# Patient Record
(demographics unavailable — no encounter records)

---

## 2024-10-22 NOTE — HEALTH RISK ASSESSMENT
[1/2 of Days or More (2)] : 4.) Feeling tired or having little energy? Half the days or more [2] : 2) Feeling down, depressed, or hopeless for more than half of the days (2) [PHQ-2 Positive] : PHQ-2 Positive [PHQ-9 Positive] : PHQ-9 Positive [HIV test declined] : HIV test declined [Hepatitis C test declined] : Hepatitis C test declined [Employed] : employed [Sexually Active] : sexually active [Fully functional (bathing, dressing, toileting, transferring, walking, feeding)] : Fully functional (bathing, dressing, toileting, transferring, walking, feeding) [Fully functional (using the telephone, shopping, preparing meals, housekeeping, doing laundry, using] : Fully functional and needs no help or supervision to perform IADLs (using the telephone, shopping, preparing meals, housekeeping, doing laundry, using transportation, managing medications and managing finances) [Current] : Current [0-4] : 0-4 [Nearly Every Day (3)] : 2.) Feeling down, depressed or hopeless? Nearly every day [Several Days (1)] : 6.) Feeling bad about yourself, or that you are a failure, or have let yourself or your family down? Several days [Not at All (0)] : 9.) Thoughts that you would be off dead or of hurting yourself in some way? Not at all [Moderate] : Severity of Depression is Moderate [Very Difficult] : How difficult have these problems made it for you to do your work, take care of things at home, or get along with people? Very difficult [I have developed a follow-up plan documented below in the note.] : I have developed a follow-up plan documented below in the note. [[id number]] : 4 [[id number]] : 10 [Reports changes in hearing] : Reports no changes in hearing [Reports changes in vision] : Reports no changes in vision [PapSmearComments] : due

## 2024-10-22 NOTE — PHYSICAL EXAM
[No Acute Distress] : no acute distress [Normal Sclera/Conjunctiva] : normal sclera/conjunctiva [Normal Outer Ear/Nose] : the outer ears and nose were normal in appearance [Normal] : affect was normal and insight and judgment were intact [de-identified] : underweight [de-identified] : depressed mood, no suicidal thoughts or thoughts of self harm

## 2024-10-22 NOTE — HISTORY OF PRESENT ILLNESS
[FreeTextEntry1] : annual physical [de-identified] : Pt is a 21 y/o F with PMHx of anxiety who presents to the office today for an annual physical  Mental health: - depressed mood - occurring for past few months - pt is requesting help from mental health counselor - See below for PHQ9  Pt is inquiring about a possible allergy to fish. 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 She also can get upset stomach and diarrhea.  HCM - Influenza vaccine: defers - Tdap vaccine: needs. defers - Gardasil: UTD  - Pap: never had, due - Ophthalmology: UTD  - Dentist: needs  - Diet: pescatarian (previously vegetarian). Tries to eat a well balanced diet - Declines STD testing

## 2024-10-22 NOTE — END OF VISIT
[FreeTextEntry3] : I, Dr. Elliott, personally performed the evaluation and management (E/M) services for this established patient who presents today with (a) new problem(s)/exacerbation of (an) existing condition(s).  That E/M includes conducting the examination, assessing all new/exacerbated conditions, and establishing a new plan of care.  Today, my PA, Carrie Falcon, was here to observe my evaluation and management services for this new problem/exacerbated condition to be followed going forward.

## 2024-12-18 NOTE — HISTORY OF PRESENT ILLNESS
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  Patient will be living off campus.  Needs proof of vaccine for MMR and meningococcal. Pt is up to date on vaccines as per records.

## 2024-12-18 NOTE — PHYSICAL EXAM
[No Acute Distress] : no acute distress [Well-Appearing] : well-appearing [No Respiratory Distress] : no respiratory distress  [Normal] : affect was normal and insight and judgment were intact

## 2024-12-18 NOTE — END OF VISIT
[Time Spent: ___ minutes] : I have spent [unfilled] minutes of time on the encounter which excludes teaching and separately reported services. [FreeTextEntry3] : I, Dr. Elliott, personally performed the evaluation and management (E/M) services for this established patient who presents today with (a) new problem(s)/exacerbation of (an) existing condition(s).  That E/M includes conducting the examination, assessing all new/exacerbated conditions, and establishing a new plan of care.  Today, my PA, Carrie Falcon, was here to observe my evaluation and management services for this new problem/exacerbated condition to be followed going forward.

## 2025-01-16 NOTE — HEALTH RISK ASSESSMENT
[0] : 2) Feeling down, depressed, or hopeless: Not at all (0) [PHQ-2 Negative - No further assessment needed] : PHQ-2 Negative - No further assessment needed [[id number]] : 0 [Never] : Never

## 2025-01-16 NOTE — HISTORY OF PRESENT ILLNESS
[FreeTextEntry1] : needs vax for school [de-identified] : school forms completed at last visit - submitted to student health - recommend meningitis booster   neck and low back pain - muscles feels stiff - low back a/w twinges of electric shock - pt feels like she needs a massage and will feel better

## 2025-03-04 NOTE — PHYSICAL EXAM
[No Acute Distress] : no acute distress [Normal Sclera/Conjunctiva] : normal sclera/conjunctiva [No Edema] : there was no peripheral edema [Normal] : affect was normal and insight and judgment were intact [de-identified] : fast rate [de-identified] : cap refill < 2 sec

## 2025-03-04 NOTE — HISTORY OF PRESENT ILLNESS
[FreeTextEntry1] :  f/u fatigue [de-identified] : Pt is a 23 y/o F who presents to the office today for f/u fatigue  Fatigue: - has been occurring for past 2 years, but more noticeable now - sleeps 8 hours a night - pescatarian diet - 3 meals a day - in school and working - feels that the fatigue is interfering with her ability to perform well at work and school - feels like she can sleep throughout the day - reports mood as good and well controlled with psych - can wake up in the morning with a headaches - pt is inquiring if symptoms could be from SIBO - also endorses abd bloating, or hormonal imbalance - pt reports menstrual periods as regular. Labs from October WNL - + aches - in feet after standing all day at work - + hx of rosacea - pt shows picture of rash c/w rosacea - denies fever, chills, new rash, snoring  Palpitations: - states she has had partial work up before - occurs once Q 2 weeks - pt is aware when her HR is fast

## 2025-03-04 NOTE — HISTORY OF PRESENT ILLNESS
[FreeTextEntry1] :  f/u fatigue [de-identified] : Pt is a 23 y/o F who presents to the office today for f/u fatigue  Fatigue: - has been occurring for past 2 years, but more noticeable now - sleeps 8 hours a night - pescatarian diet - 3 meals a day - in school and working - feels that the fatigue is interfering with her ability to perform well at work and school - feels like she can sleep throughout the day - reports mood as good and well controlled with psych - can wake up in the morning with a headaches - pt is inquiring if symptoms could be from SIBO - also endorses abd bloating, or hormonal imbalance - pt reports menstrual periods as regular. Labs from October WNL - + aches - in feet after standing all day at work - + hx of rosacea - pt shows picture of rash c/w rosacea - denies fever, chills, new rash, snoring  Palpitations: - states she has had partial work up before - occurs once Q 2 weeks - pt is aware when her HR is fast

## 2025-03-04 NOTE — PHYSICAL EXAM
[No Acute Distress] : no acute distress [Normal Sclera/Conjunctiva] : normal sclera/conjunctiva [No Edema] : there was no peripheral edema [Normal] : affect was normal and insight and judgment were intact [de-identified] : fast rate [de-identified] : cap refill < 2 sec

## 2025-04-16 NOTE — REVIEW OF SYSTEMS
[Abdominal Pain] : abdominal pain [Diarrhea] : diarrhea [Bloating (gassiness)] : bloating [As Noted in HPI] : as noted in HPI [Skin Lesions] : skin lesion [Itching] : itching [Negative] : Heme/Lymph [Vomiting] : no vomiting [Constipation] : no constipation [Heartburn] : no heartburn [Melena (black stool)] : no melena [Bleeding] : no bleeding [Fecal Incontinence (soiling)] : no fecal incontinence

## 2025-04-16 NOTE — HISTORY OF PRESENT ILLNESS
[FreeTextEntry1] : HPI- 22F no PMHx presenting with complaints of abdominal bloating, loose stools, fatigue for years.   Long-standing issues of GI issues dating back to HS. Does not recall what her symptoms were like then but was told her symptoms were related to IBS. Saw a dietitian then which she didn't feel like it helped then. Also noticed she developed Rosacea which started 4 years ago. Has had ongoing skin issues. Even feels like she has pruritus with eating certain foods, like eggplant.  Has felt like she intermittently has lower abdominal pain preceding BM. After a BM, she feels better. Stools for the most part are bristol 5-6 stools, has 2-3 BMs per day. Non-bloody. Has always struggled with gaining weight but when she does it localizes to abdomen, feels bloated.  Pt inquiring if symptoms could be from SIBO  ROS + nausea ROS negative for vomiting, weight stable.  Avoids abxs, no NSAIDs  Being evaluated by cardiology for palpitations. Is due to wear a heart monitor for 2 weeks to look into this. Denies any chest pain, shortness of breath associated with these episodes.   Remainder of ROS negative.  BW: Hgb 14.2 (10/22/24)  (10/22/24) Cr 0.74 (10/22/24) TB 0.6 (10/22/24) Alk phos 69 (10/22/24) AST 22 (10/22/24) ALT 28 (10/22/24)  Referred by - Dr Elliott  PMHx -none PSHx - none Rx -none Supplements/herbs/OTC - vit D, magnesium glycinate, K2 A/C or NSAIDs? - denies FMHx -no CRC or GI related malignancy; no IBD Allergies - NKDA EtOH -socially Smoking - never smoker Drugs - denies Diet - pescatarian diet - 3 meals a day  Breath Tests - none EGD - no prior Colonoscopy - no prior

## 2025-04-16 NOTE — PHYSICAL EXAM
[Alert] : alert [Normal Voice/Communication] : normal voice/communication [Healthy Appearing] : healthy appearing [No Acute Distress] : no acute distress [Sclera] : the sclera and conjunctiva were normal [Hearing Threshold Finger Rub Not Crane] : hearing was normal [Normal Lips/Gums] : the lips and gums were normal [Oropharynx] : the oropharynx was normal [Normal Appearance] : the appearance of the neck was normal [No Neck Mass] : no neck mass was observed [No Respiratory Distress] : no respiratory distress [No Acc Muscle Use] : no accessory muscle use [Respiration, Rhythm And Depth] : normal respiratory rhythm and effort [Abdomen Tenderness] : non-tender [No Masses] : no abdominal mass palpated [Abdomen Soft] : soft [Cervical Lymph Nodes Enlarged Posterior Bilaterally] : no posterior cervical lymphadenopathy [Supraclavicular Lymph Nodes Enlarged Bilaterally] : no supraclavicular lymphadenopathy [Axillary Lymph Nodes Enlarged Bilaterally] : no axillary lymphadenopathy [No CVA Tenderness] : no CVA  tenderness [No Spinal Tenderness] : no spinal tenderness [Abnormal Walk] : normal gait [No Clubbing, Cyanosis] : no clubbing or cyanosis of the fingernails [No Joint Swelling] : no joint swelling seen [Normal Color / Pigmentation] : normal skin color and pigmentation [] : no rash [Cranial Nerves Intact] : cranial nerves 2-12 were intact [Sensation] : the sensory exam was normal to light touch and pinprick [Motor Exam] : the motor exam was normal [Oriented To Time, Place, And Person] : oriented to person, place, and time

## 2025-04-16 NOTE — ASSESSMENT
[FreeTextEntry1] : 22F no PMHx presenting with complaints of abdominal bloating, loose stools, fatigue for years.   #Abdominal bloating #Fatigue  #Nausea #Food intolerance  #Diarrhea Clinical presentation may be consistent with IBS however cannot rule out other entities including celiac disease, H pylori, SIBO. No clear risk factors from SIBO from history. -Ordered for H pylori breath test  -Ordered for CBC, BMP, celiac serologies, fecal calprotectin, GI PCR, CRP, tryptase -Pending above results, will determine role for endoscopic evaluation +/- SIBO testing   RTC in 2 months